# Patient Record
Sex: MALE | Race: BLACK OR AFRICAN AMERICAN | NOT HISPANIC OR LATINO | URBAN - METROPOLITAN AREA
[De-identification: names, ages, dates, MRNs, and addresses within clinical notes are randomized per-mention and may not be internally consistent; named-entity substitution may affect disease eponyms.]

---

## 2018-02-06 ENCOUNTER — EMERGENCY (EMERGENCY)
Facility: HOSPITAL | Age: 23
LOS: 1 days | Discharge: HOME | End: 2018-02-06
Attending: EMERGENCY MEDICINE
Payer: SELF-PAY

## 2018-02-06 VITALS
SYSTOLIC BLOOD PRESSURE: 160 MMHG | OXYGEN SATURATION: 96 % | TEMPERATURE: 98 F | DIASTOLIC BLOOD PRESSURE: 80 MMHG | WEIGHT: 220.02 LBS | RESPIRATION RATE: 20 BRPM | HEART RATE: 112 BPM | HEIGHT: 73 IN

## 2018-02-06 PROCEDURE — 99282 EMERGENCY DEPT VISIT SF MDM: CPT

## 2018-02-06 PROCEDURE — 99282 EMERGENCY DEPT VISIT SF MDM: CPT | Mod: 25

## 2018-02-06 PROCEDURE — 99053 MED SERV 10PM-8AM 24 HR FAC: CPT

## 2018-02-06 NOTE — ED PROVIDER NOTE - MEDICAL DECISION MAKING DETAILS
cockroach removed as documeted post removal exam sahowing no tm perforation minor irritation right eac no hearing loss

## 2018-02-06 NOTE — ED PROVIDER NOTE - OBJECTIVE STATEMENT
pt presents with right ear pain sudden onset " feel like abug is in there " no otorrhea. no hearing loss or tinnitus. no oth3er acute issues symptoms or concerns. no prior tx. right ear pain constant achy non radiating 2/10

## 2018-02-06 NOTE — ED PROVIDER NOTE - ENMT, MLM
Airway patent, Nasal mucosa clear. Mouth with normal mucosa. Throat has no vesicles, no oropharyngeal exudates and uvula is midline.  ear : + cockroach in right EAC

## 2021-02-18 ENCOUNTER — EMERGENCY (EMERGENCY)
Facility: HOSPITAL | Age: 26
LOS: 1 days | Discharge: HOME | End: 2021-02-18
Attending: EMERGENCY MEDICINE
Payer: SELF-PAY

## 2021-02-18 VITALS
WEIGHT: 255.07 LBS | OXYGEN SATURATION: 99 % | TEMPERATURE: 98 F | SYSTOLIC BLOOD PRESSURE: 134 MMHG | HEART RATE: 105 BPM | DIASTOLIC BLOOD PRESSURE: 72 MMHG | HEIGHT: 73 IN | RESPIRATION RATE: 19 BRPM

## 2021-02-18 PROCEDURE — 99283 EMERGENCY DEPT VISIT LOW MDM: CPT | Mod: 25

## 2021-02-18 PROCEDURE — 99053 MED SERV 10PM-8AM 24 HR FAC: CPT

## 2021-02-18 PROCEDURE — 99284 EMERGENCY DEPT VISIT MOD MDM: CPT

## 2021-02-18 PROCEDURE — 96372 THER/PROPH/DIAG INJ SC/IM: CPT

## 2021-02-18 RX ORDER — LIDOCAINE 4 G/100G
1 CREAM TOPICAL ONCE
Refills: 0 | Status: COMPLETED | OUTPATIENT
Start: 2021-02-18 | End: 2021-02-18

## 2021-02-18 RX ORDER — IBUPROFEN 200 MG
600 TABLET ORAL ONCE
Refills: 0 | Status: COMPLETED | OUTPATIENT
Start: 2021-02-18 | End: 2021-02-18

## 2021-02-18 RX ORDER — DEXAMETHASONE 0.5 MG/5ML
6 ELIXIR ORAL ONCE
Refills: 0 | Status: COMPLETED | OUTPATIENT
Start: 2021-02-18 | End: 2021-02-18

## 2021-02-18 RX ORDER — ACETAMINOPHEN 500 MG
650 TABLET ORAL ONCE
Refills: 0 | Status: COMPLETED | OUTPATIENT
Start: 2021-02-18 | End: 2021-02-18

## 2021-02-18 RX ORDER — CYCLOBENZAPRINE HYDROCHLORIDE 10 MG/1
1 TABLET, FILM COATED ORAL
Qty: 21 | Refills: 0
Start: 2021-02-18 | End: 2021-02-24

## 2021-02-18 RX ADMIN — LIDOCAINE 1 PATCH: 4 CREAM TOPICAL at 07:44

## 2021-02-18 RX ADMIN — Medication 650 MILLIGRAM(S): at 07:44

## 2021-02-18 RX ADMIN — Medication 600 MILLIGRAM(S): at 07:44

## 2021-02-18 RX ADMIN — Medication 6 MILLIGRAM(S): at 07:44

## 2021-02-18 NOTE — ED ADULT TRIAGE NOTE - CHIEF COMPLAINT QUOTE
Patient complain of back pain with numbness tingling radiating to groin and left lower extremity started while doing cleaning  at home four days ago. Patient denies any trauma and fall.

## 2021-02-18 NOTE — ED PROVIDER NOTE - NSFOLLOWUPINSTRUCTIONS_ED_ALL_ED_FT
-- Please follow up with your doctor(s) within the next 3 days, but seek medical attention sooner if your symptoms persist or worsen.  Please call tomorrow for an appointment.  If you cannot follow-up with your primary doctor please return to the ED for any urgent issues.    -- You were given a copy of your labs and imaging.  Please go over these with your doctor(s).     -- If you have any worsening of symptoms or any other concerns please see your doctor or return to the nearest emergency room immediately.    -- Please continue taking your home medications as directed.  Do not use alcohol when taking any medication (especially antibiotics, tylenol or other pain medication) unless you check with the doctor or pharmacist.    **************************************************************************************************************  Back Pain    Back pain is very common in adults. The cause of back pain is rarely dangerous and the pain often gets better over time. The cause of your back pain may not be known and may include strain of muscles or ligaments, degeneration of the spinal disks, or arthritis. Occasionally the pain may radiate down your leg(s). Over-the-counter medicines to reduce pain and inflammation are often the most helpful. Stretching and remaining active frequently helps the healing process.     SEEK IMMEDIATE MEDICAL CARE IF YOU HAVE ANY OF THE FOLLOWING SYMPTOMS: bowel or bladder control problems, unusual weakness or numbness in your arms or legs, nausea or vomiting, abdominal pain, fever, dizziness/lightheadedness.

## 2021-02-18 NOTE — ED PROVIDER NOTE - OBJECTIVE STATEMENT
Pt is a 24 y/o M w/no reported PMHx presents c/o lower back pain.  Pt states his pain started after he woke up 5 days ago.  He describes the pain as located in the left lower back with radiation down his left leg.  He has had this pain before, but this episode has been worse and lasted longer.  Pain at its worst was 10/10, and he was unable to ambulate with out assistance.  He has tried motrin and aleve, but has not had much relief.  He states he used to lift weights, but no longer, and denies any trauma or strenuous activity precipitating the pain onset. Pt is a 24 y/o M w/no reported PMHx presents c/o lower back pain.  Pt states his pain started after he woke up 5 days ago.  He describes the pain as located in the left lower back with radiation down his left leg.  He has had this pain before, but this episode has been worse and lasted longer.  Pain at its worst was 10/10, and he was unable to ambulate with out assistance.  He has tried Motrin and aleve, but has not had much relief.  He states he used to lift weights, but no longer, and denies any trauma or strenuous activity precipitating the pain onset.

## 2021-02-18 NOTE — ED PROVIDER NOTE - PSH
Went to the ER on 4/10 with shortness of breath and feeling unwell, severe headache, nausea and vomiting. She has lost the sense of taste. She is not SOB now. She feels well accept for the loss of taste and sense of smell. She is very concerned because she keeps her grandkids. She was trying to get in touch with her PCP to try to find out if she could have an order for a CV19 test but was routed here. I have directed the patient to call her local health department for further information about testing as we do not provide information about testing on this access line. No significant past surgical history

## 2021-02-18 NOTE — ED PROVIDER NOTE - PHYSICAL EXAMINATION
General: well appearing, interactive, well nourished, NAD  HEENT: pupils equal and reactive, normal external ears bilaterally   Cardiac: RRR, no MRG appreciated  Resp: lungs clear to auscultation bilaterally, symmetric chest wall rise  Abd: soft, nontender, nondistended,   : no CVA tenderness  Neuro: Moving all extremities, sensation intact  Skin:  normal color for race  MSK: FROM extension/flexion, +mild ttp left lower back General: well appearing, interactive, well nourished, NAD  HEENT: pupils equal and reactive, normal external ears bilaterally   Cardiac: RRR, no MRG appreciated  Resp: lungs clear to auscultation bilaterally, symmetric chest wall rise  Abd: soft, nontender, nondistended,   : no CVA tenderness  Neuro: Moving all extremities, sensation intact  Skin:  normal color for race  MSK: FROM extension/flexion, +mild ttp left lower back.

## 2021-02-18 NOTE — ED PROVIDER NOTE - PATIENT PORTAL LINK FT
You can access the FollowMyHealth Patient Portal offered by [facility] by registering at the following website: [email or web address]. By joining [facility]’s FollowMyHealth portal, you will also be able to view your health information using other applications (apps) compatible with our system.

## 2021-02-18 NOTE — ED PROVIDER NOTE - NS ED ROS FT
CONSTITUTIONAL: No fevers, no chills  Eyes: No vision changes  Cardiovascular: No Chest pain  Respiratory: No SOB  Gastrointestinal: No n/v/c/d, no abd pain  Genitourinary: no dysuria, no hematuria  SKIN: no rashes.  MSK: +lower back pain  NEURO: no headache, no weakness, +numbness left leg  PSYCHIATRIC: no SI/HI CONSTITUTIONAL: No fevers, no chills  Eyes: No vision changes  Cardiovascular: No Chest pain  Respiratory: No SOB  Gastrointestinal: No n/v/c/d, no abd pain  Genitourinary: no dysuria, no hematuria  SKIN: no rashes.  MSK: +lower back pain  NEURO: no headache, no weakness, +numbness left leg  PSYCHIATRIC: no SI/HI.

## 2023-09-19 NOTE — ED ADULT TRIAGE NOTE - HEART RATE (BEATS/MIN)
105 Comment: Helmet induced Acne\\nFlaring today on chin and forehead\\nCurrently using Seysara 100mg and Twyneo cream- ran out of clindamycin few weeks back\\n\\nDiscussed Accutane and all possible side effects of Accutane with patient and mother including Dryness, soreness, headaches, HLD/LFTs, need for regular labs and monthly visits.\\n\\nFull discussion done today. Explained studies showing possible correlation with IBD, but no clear causation, unclear if these cases of IBD are comorbidities with inflammatory acne or linked directly to Accutane, future studies being done. Pt voiced clear understanding of this possible link and risk. Also explained historic link to MDD, as well as more recent body of evidence suggesting link is very weak and most patients do not have significant MDD on Accutane, but explained this potential link and to notify immediately and stop medication if experiencing any mood changes” \\n\\nPatient elected for Accutane today\\n\\n-Denies h/o depression, cron’s disease or blood clots\\n-Paperwork completed at time of Appt \\n-Lab sheet given to patient\\n-Recommend stopping Seysara today and wait 2 weeks to start Accutane Detail Level: Simple Render Risk Assessment In Note?: no Comment: Will schedule removal if desire in future

## 2025-03-12 ENCOUNTER — OFFICE (OUTPATIENT)
Dept: URBAN - METROPOLITAN AREA CLINIC 94 | Facility: CLINIC | Age: 30
Setting detail: OPHTHALMOLOGY
End: 2025-03-12
Payer: COMMERCIAL

## 2025-03-12 DIAGNOSIS — H52.13: ICD-10-CM

## 2025-03-12 PROCEDURE — SCREF LASIK EVAL: Performed by: OPHTHALMOLOGY

## 2025-03-12 ASSESSMENT — KERATOMETRY
OD_K1POWER_DIOPTERS: 41.75
OS_K1POWER_DIOPTERS: 41.75
OS_K2POWER_DIOPTERS: 43.00
OD_AXISANGLE_DEGREES: 085
OD_K2POWER_DIOPTERS: 43.00
OS_AXISANGLE_DEGREES: 109

## 2025-03-12 ASSESSMENT — PACHYMETRY
OS_CT_CORRECTION: 1
OD_CT_UM: 532
OD_CT_CORRECTION: 1
OS_CT_UM: 538

## 2025-03-12 ASSESSMENT — REFRACTION_AUTOREFRACTION
OS_SPHERE: -6.00
OD_CYLINDER: -0.50
OS_CYLINDER: -1.25
OS_AXIS: 006
OD_SPHERE: -6.75
OD_AXIS: 008

## 2025-03-12 ASSESSMENT — VISUAL ACUITY
OS_BCVA: 20/150
OD_BCVA: 20/150

## 2025-03-12 ASSESSMENT — REFRACTION_MANIFEST
OS_AXIS: 006
OS_CYLINDER: -0.75
OS_SPHERE: -6.00
OD_SPHERE: -6.50

## 2025-03-12 ASSESSMENT — CONFRONTATIONAL VISUAL FIELD TEST (CVF)
OD_FINDINGS: FULL
OS_FINDINGS: FULL

## 2025-03-12 ASSESSMENT — TONOMETRY
OD_IOP_MMHG: 19
OS_IOP_MMHG: 16

## 2025-04-16 ENCOUNTER — OFFICE (OUTPATIENT)
Dept: URBAN - METROPOLITAN AREA CLINIC 109 | Facility: CLINIC | Age: 30
Setting detail: OPHTHALMOLOGY
End: 2025-04-16
Payer: COMMERCIAL

## 2025-04-16 DIAGNOSIS — H52.13: ICD-10-CM

## 2025-04-16 PROCEDURE — SCREF LASIK EVAL: Performed by: OPHTHALMOLOGY

## 2025-04-16 ASSESSMENT — CONFRONTATIONAL VISUAL FIELD TEST (CVF)
OS_FINDINGS: FULL
OD_FINDINGS: FULL

## 2025-04-16 ASSESSMENT — PACHYMETRY
OD_CT_CORRECTION: 1
OD_CT_UM: 532
OS_CT_UM: 538
OS_CT_CORRECTION: 1

## 2025-04-16 ASSESSMENT — TONOMETRY
OD_IOP_MMHG: 15
OS_IOP_MMHG: 15

## 2025-04-17 ENCOUNTER — OTHER LOCATION (OUTPATIENT)
Dept: URBAN - METROPOLITAN AREA LASIK CENTER 6 | Facility: LASIK CENTER | Age: 30
Setting detail: OPHTHALMOLOGY
End: 2025-04-17

## 2025-04-17 DIAGNOSIS — H52.13: ICD-10-CM

## 2025-04-17 PROCEDURE — 65760 KERATOMILEUSIS: CPT | Mod: GY,RT,LT | Performed by: OPHTHALMOLOGY

## 2025-04-17 ASSESSMENT — REFRACTION_AUTOREFRACTION
OD_SPHERE: -6.50
OD_AXIS: 28
OS_CYLINDER: -1.25
OD_CYLINDER: -0.25
OS_AXIS: 11
OS_SPHERE: -5.75

## 2025-04-17 ASSESSMENT — VISUAL ACUITY
OS_BCVA: 20/30-
OD_BCVA: 20/40-3

## 2025-04-17 ASSESSMENT — REFRACTION_MANIFEST
OS_CYLINDER: -0.75
OD_SPHERE: -6.50
OS_SPHERE: -6.00
OS_AXIS: 010
OD_CYLINDER: SPH
OD_SPHERE: -6.50
OS_AXIS: 006
OS_VA1: 20/20
OU_VA: 20/15-
OD_VA1: 20/20
OS_SPHERE: -6.00
OS_CYLINDER: -1.00

## 2025-04-17 ASSESSMENT — KERATOMETRY
OD_K2POWER_DIOPTERS: 43.00
OS_AXISANGLE_DEGREES: 106
OS_K1POWER_DIOPTERS: 41.75
OD_AXISANGLE_DEGREES: 82
OD_K1POWER_DIOPTERS: 42.00
OS_K2POWER_DIOPTERS: 43.00

## 2025-04-18 ENCOUNTER — RX ONLY (RX ONLY)
Age: 30
End: 2025-04-18

## 2025-04-18 ENCOUNTER — OFFICE (OUTPATIENT)
Dept: URBAN - METROPOLITAN AREA CLINIC 109 | Facility: CLINIC | Age: 30
Setting detail: OPHTHALMOLOGY
End: 2025-04-18
Payer: COMMERCIAL

## 2025-04-18 DIAGNOSIS — H52.13: ICD-10-CM

## 2025-04-18 PROCEDURE — 99024 POSTOP FOLLOW-UP VISIT: CPT | Performed by: OPHTHALMOLOGY

## 2025-04-18 ASSESSMENT — REFRACTION_MANIFEST
OS_CYLINDER: -1.00
OS_AXIS: 010
OD_CYLINDER: SPH
OS_SPHERE: -6.00
OD_VA1: 20/20
OD_SPHERE: -6.50
OS_AXIS: 006
OS_SPHERE: -6.00
OU_VA: 20/15-
OS_CYLINDER: -0.75
OD_SPHERE: -6.50
OS_VA1: 20/20

## 2025-04-18 ASSESSMENT — KERATOMETRY
OS_K2POWER_DIOPTERS: 43.00
OD_AXISANGLE_DEGREES: 82
OD_K1POWER_DIOPTERS: 42.00
OS_AXISANGLE_DEGREES: 106
OS_K1POWER_DIOPTERS: 41.75
OD_K2POWER_DIOPTERS: 43.00

## 2025-04-18 ASSESSMENT — VISUAL ACUITY
OS_BCVA: 20/25
OD_BCVA: 20/30

## 2025-04-18 ASSESSMENT — REFRACTION_AUTOREFRACTION
OS_AXIS: 11
OS_SPHERE: -5.75
OD_SPHERE: -6.50
OD_CYLINDER: -0.25
OD_AXIS: 28
OS_CYLINDER: -1.25

## 2025-04-18 ASSESSMENT — CONFRONTATIONAL VISUAL FIELD TEST (CVF)
OD_FINDINGS: FULL
OS_FINDINGS: FULL

## 2025-04-21 ENCOUNTER — OFFICE (OUTPATIENT)
Dept: URBAN - METROPOLITAN AREA CLINIC 109 | Facility: CLINIC | Age: 30
Setting detail: OPHTHALMOLOGY
End: 2025-04-21
Payer: COMMERCIAL

## 2025-04-21 DIAGNOSIS — H52.13: ICD-10-CM

## 2025-04-21 PROCEDURE — 99024 POSTOP FOLLOW-UP VISIT: CPT | Performed by: OPHTHALMOLOGY

## 2025-04-21 ASSESSMENT — REFRACTION_MANIFEST
OS_SPHERE: -6.00
OS_AXIS: 006
OD_SPHERE: -6.50
OD_SPHERE: -6.50
OD_CYLINDER: SPH
OS_SPHERE: -6.00
OD_VA1: 20/20
OS_VA1: 20/20
OS_AXIS: 010
OU_VA: 20/15-
OS_CYLINDER: -1.00
OS_CYLINDER: -0.75

## 2025-04-21 ASSESSMENT — REFRACTION_AUTOREFRACTION
OD_AXIS: 28
OD_CYLINDER: -0.25
OD_SPHERE: -6.50
OS_AXIS: 11
OS_SPHERE: -5.75
OS_CYLINDER: -1.25

## 2025-04-21 ASSESSMENT — KERATOMETRY
OS_AXISANGLE_DEGREES: 106
OS_K1POWER_DIOPTERS: 41.75
OD_K1POWER_DIOPTERS: 42.00
OS_K2POWER_DIOPTERS: 43.00
OD_AXISANGLE_DEGREES: 82
OD_K2POWER_DIOPTERS: 43.00

## 2025-04-21 ASSESSMENT — VISUAL ACUITY
OD_BCVA: 20/40-2
OS_BCVA: 20/40-2

## 2025-04-21 ASSESSMENT — CONFRONTATIONAL VISUAL FIELD TEST (CVF)
OS_FINDINGS: FULL
OD_FINDINGS: FULL

## 2025-04-25 ENCOUNTER — OFFICE (OUTPATIENT)
Dept: URBAN - METROPOLITAN AREA CLINIC 94 | Facility: CLINIC | Age: 30
Setting detail: OPHTHALMOLOGY
End: 2025-04-25
Payer: COMMERCIAL

## 2025-04-25 DIAGNOSIS — H52.13: ICD-10-CM

## 2025-04-25 PROCEDURE — 99024 POSTOP FOLLOW-UP VISIT: CPT | Performed by: PHYSICIAN ASSISTANT

## 2025-04-25 ASSESSMENT — KERATOMETRY
OS_K1POWER_DIOPTERS: 37.25
OD_K1POWER_DIOPTERS: 36.50
OS_K2POWER_DIOPTERS: 38.25
OS_AXISANGLE_DEGREES: 158
OD_AXISANGLE_DEGREES: 085
OD_K2POWER_DIOPTERS: 37.75

## 2025-04-25 ASSESSMENT — VISUAL ACUITY
OS_BCVA: 20/30
OD_BCVA: 20/40

## 2025-04-25 ASSESSMENT — REFRACTION_MANIFEST
OD_CYLINDER: SPH
OS_CYLINDER: -0.75
OS_SPHERE: -6.00
OS_AXIS: 010
OS_CYLINDER: -1.00
OU_VA: 20/15-
OD_VA1: 20/20
OD_SPHERE: -6.50
OD_SPHERE: -6.50
OS_AXIS: 006
OS_SPHERE: -6.00
OS_VA1: 20/20

## 2025-04-25 ASSESSMENT — REFRACTION_AUTOREFRACTION
OD_SPHERE: -0.75
OS_CYLINDER: -0.75
OS_SPHERE: -1.25
OS_AXIS: 049
OD_CYLINDER: -0.75
OD_AXIS: 011

## 2025-04-25 ASSESSMENT — CONFRONTATIONAL VISUAL FIELD TEST (CVF)
OD_FINDINGS: FULL
OS_FINDINGS: FULL

## 2025-05-02 NOTE — ED ADULT TRIAGE NOTE - NSTRIAGECARE_GEN_A_ER
Face Mask
Is This A New Presentation, Or A Follow-Up?: Skin Lesions
How Severe Is Your Skin Lesion?: mild
Aury Gusman

## 2025-05-12 ENCOUNTER — OFFICE (OUTPATIENT)
Dept: URBAN - METROPOLITAN AREA CLINIC 109 | Facility: CLINIC | Age: 30
Setting detail: OPHTHALMOLOGY
End: 2025-05-12
Payer: COMMERCIAL

## 2025-05-12 DIAGNOSIS — H52.13: ICD-10-CM

## 2025-05-12 PROCEDURE — 99024 POSTOP FOLLOW-UP VISIT: CPT | Performed by: OPHTHALMOLOGY

## 2025-05-12 ASSESSMENT — KERATOMETRY
OD_K1POWER_DIOPTERS: 36.50
OD_AXISANGLE_DEGREES: 085
OS_K2POWER_DIOPTERS: 38.25
OS_K1POWER_DIOPTERS: 37.25
OS_AXISANGLE_DEGREES: 158
OD_K2POWER_DIOPTERS: 37.75

## 2025-05-12 ASSESSMENT — PACHYMETRY
OS_CT_CORRECTION: 1
OD_CT_CORRECTION: 1
OS_CT_UM: 538
OD_CT_UM: 532

## 2025-05-12 ASSESSMENT — REFRACTION_AUTOREFRACTION
OS_CYLINDER: -0.50
OS_SPHERE: -0.25
OD_SPHERE: -0.75
OD_AXIS: 85
OD_CYLINDER: -0.50
OS_AXIS: 129

## 2025-05-12 ASSESSMENT — REFRACTION_MANIFEST
OS_SPHERE: -6.00
OS_CYLINDER: -1.00
OD_SPHERE: -6.50
OS_AXIS: 006
OU_VA: 20/15-
OS_CYLINDER: -0.75
OS_VA1: 20/20
OD_CYLINDER: SPH
OS_SPHERE: -6.00
OD_SPHERE: -6.50
OS_AXIS: 010
OD_VA1: 20/20

## 2025-05-12 ASSESSMENT — CONFRONTATIONAL VISUAL FIELD TEST (CVF)
OD_FINDINGS: FULL
OS_FINDINGS: FULL

## 2025-05-12 ASSESSMENT — TONOMETRY
OD_IOP_MMHG: 12
OS_IOP_MMHG: 13

## 2025-05-12 ASSESSMENT — VISUAL ACUITY
OD_BCVA: 20/20-
OS_BCVA: 20/25-1

## 2025-07-28 ENCOUNTER — OFFICE (OUTPATIENT)
Dept: URBAN - METROPOLITAN AREA CLINIC 109 | Facility: CLINIC | Age: 30
Setting detail: OPHTHALMOLOGY
End: 2025-07-28
Payer: COMMERCIAL

## 2025-07-28 DIAGNOSIS — H52.13: ICD-10-CM

## 2025-07-28 PROCEDURE — 99024 POSTOP FOLLOW-UP VISIT: CPT | Performed by: OPHTHALMOLOGY

## 2025-07-28 ASSESSMENT — PACHYMETRY
OS_CT_UM: 538
OS_CT_CORRECTION: 1
OD_CT_CORRECTION: 1
OD_CT_UM: 532

## 2025-07-28 ASSESSMENT — KERATOMETRY
OD_K2POWER_DIOPTERS: 37.75
OS_AXISANGLE_DEGREES: 158
OD_K1POWER_DIOPTERS: 36.50
OD_AXISANGLE_DEGREES: 085
OS_K1POWER_DIOPTERS: 37.25
OS_K2POWER_DIOPTERS: 38.25

## 2025-07-28 ASSESSMENT — VISUAL ACUITY
OS_BCVA: 20/20
OD_BCVA: 20/20-2

## 2025-07-28 ASSESSMENT — REFRACTION_MANIFEST
OD_SPHERE: -6.50
OU_VA: 20/15-
OS_SPHERE: -6.00
OD_CYLINDER: SPH
OD_SPHERE: -6.50
OS_CYLINDER: -1.00
OS_VA1: 20/20
OS_CYLINDER: -0.75
OS_AXIS: 006
OD_VA1: 20/20
OS_AXIS: 010
OS_SPHERE: -6.00

## 2025-07-28 ASSESSMENT — CONFRONTATIONAL VISUAL FIELD TEST (CVF)
OD_FINDINGS: FULL
OS_FINDINGS: FULL

## 2025-07-28 ASSESSMENT — REFRACTION_AUTOREFRACTION
OS_AXIS: 126
OD_AXIS: 075
OS_CYLINDER: -0.50
OS_SPHERE: +0.50
OD_CYLINDER: -0.25
OD_SPHERE: +0.25

## 2025-07-28 ASSESSMENT — TONOMETRY: OS_IOP_MMHG: 10
